# Patient Record
Sex: FEMALE | Race: WHITE | URBAN - METROPOLITAN AREA
[De-identification: names, ages, dates, MRNs, and addresses within clinical notes are randomized per-mention and may not be internally consistent; named-entity substitution may affect disease eponyms.]

---

## 2018-05-17 ENCOUNTER — TRANSFERRED RECORDS (OUTPATIENT)
Dept: HEALTH INFORMATION MANAGEMENT | Facility: CLINIC | Age: 73
End: 2018-05-17

## 2018-05-23 ENCOUNTER — TELEPHONE (OUTPATIENT)
Dept: OBGYN | Facility: CLINIC | Age: 73
End: 2018-05-23

## 2018-05-23 ENCOUNTER — OFFICE VISIT (OUTPATIENT)
Dept: OBGYN | Facility: CLINIC | Age: 73
End: 2018-05-23
Payer: MEDICARE

## 2018-05-23 VITALS
HEIGHT: 64 IN | WEIGHT: 201 LBS | SYSTOLIC BLOOD PRESSURE: 112 MMHG | DIASTOLIC BLOOD PRESSURE: 64 MMHG | BODY MASS INDEX: 34.31 KG/M2

## 2018-05-23 DIAGNOSIS — R93.89 THICKENED ENDOMETRIUM: ICD-10-CM

## 2018-05-23 DIAGNOSIS — N95.0 POSTMENOPAUSAL BLEEDING: Primary | ICD-10-CM

## 2018-05-23 PROCEDURE — 99204 OFFICE O/P NEW MOD 45 MIN: CPT | Performed by: OBSTETRICS & GYNECOLOGY

## 2018-05-23 ASSESSMENT — ANXIETY QUESTIONNAIRES
5. BEING SO RESTLESS THAT IT IS HARD TO SIT STILL: NOT AT ALL
GAD7 TOTAL SCORE: 0
7. FEELING AFRAID AS IF SOMETHING AWFUL MIGHT HAPPEN: NOT AT ALL
6. BECOMING EASILY ANNOYED OR IRRITABLE: NOT AT ALL
2. NOT BEING ABLE TO STOP OR CONTROL WORRYING: NOT AT ALL
3. WORRYING TOO MUCH ABOUT DIFFERENT THINGS: NOT AT ALL
IF YOU CHECKED OFF ANY PROBLEMS ON THIS QUESTIONNAIRE, HOW DIFFICULT HAVE THESE PROBLEMS MADE IT FOR YOU TO DO YOUR WORK, TAKE CARE OF THINGS AT HOME, OR GET ALONG WITH OTHER PEOPLE: NOT DIFFICULT AT ALL
1. FEELING NERVOUS, ANXIOUS, OR ON EDGE: NOT AT ALL

## 2018-05-23 ASSESSMENT — PATIENT HEALTH QUESTIONNAIRE - PHQ9: 5. POOR APPETITE OR OVEREATING: NOT AT ALL

## 2018-05-23 NOTE — PROGRESS NOTES
SUBJECTIVE:                                                   Vee Espino is a 72 year old female who presents to clinic today for the following health issue(s):  Patient presents with:  Consult: referred for post menopausal bleeding        HPI:  Patient is a 72-year-old  4 para 4 who was recently started on hormone therapy by Dr. Melany Woo.  She recently had what she described as a menstrual period.  An ultrasound was obtained which showed a thickened endometrium of 18 mm along with 4-1/2 cm posterior fibroid.  Patient has generally felt fatigued.  She has no other complaints.    No LMP recorded..   Patient is sexually active,    reports that she has quit smoking. She has never used smokeless tobacco.  STD testing offered?  Declined  Health maintenance updated:  Followed by primary care provider      Today's PHQ-9 Score:   PHQ-9 SCORE 2018   Total Score 0     Today's ANISH-7 Score:   ANISH-7 SCORE 2018   Total Score 0       Problem list and histories reviewed & adjusted, as indicated.  Additional history: as documented.    There is no problem list on file for this patient.    Past Surgical History:   Procedure Laterality Date     HERNIA REPAIR, UMBILICAL        Social History   Substance Use Topics     Smoking status: Former Smoker     Smokeless tobacco: Never Used     Alcohol use Not on file           Current Outpatient Prescriptions   Medication Sig     UNABLE TO FIND MEDICATION NAME: Estrogen pellets     No current facility-administered medications for this visit.      Allergies   Allergen Reactions     Cats      Ibuprofen-Diphenhydramine Cit Hives     No Clinical Screening - See Comments      Sulfa Drugs Hives       ROS:  12 point review of systems negative other than symptoms noted below.  Cardiovascular: Lower Extremity Swelling  Gastrointestinal: Abdominal Pain, Bloating and Heartburn  Genitourinary: Cramps  Skin: Rash  Musculoskeletal: Height Loss  Psychiatric: Anxiety and  "Difficulty Sleeping    OBJECTIVE:     /64  Ht 5' 3.5\" (1.613 m)  Wt 201 lb (91.2 kg)  BMI 35.05 kg/m2  Body mass index is 35.05 kg/(m^2).    Exam:  Constitutional:  Appearance: Well nourished, well developed alert, in no acute distress  Neck:  Lymph Nodes:  No lymphadenopathy present; Thyroid:  Gland size normal, nontender, no nodules or masses present on palpation  Chest:  Respiratory Effort:  Breathing unlabored  Cardiovascular: Heart: Auscultation:  Regular rate, normal rhythm, no murmurs present  Lymphatic: Lymph Nodes:  No other lymphadenopathy present  Skin:General Inspection:  No rashes present, no lesions present, no areas of discoloration; .  Neurologic/Psychiatric:  Mental Status:  Oriented X3   No Pelvic Exam performed     In-Clinic Test Results:  No results found for this or any previous visit (from the past 24 hour(s)).    ASSESSMENT/PLAN:                                                        ICD-10-CM    1. Postmenopausal bleeding N95.0 Bernadette-Operative Worksheet GYN   2. Thickened endometrium R93.8 Bernadette-Operative Worksheet GYN           Plan: Patient will be scheduled for hysteroscopy with mild Essure as an outpatient.  The thickened endometrium may represent hyperplasia or possible endometrial polyp.    Yifan Goldberg MD  [facility] [place]  "

## 2018-05-23 NOTE — TELEPHONE ENCOUNTER
Type of surgery: HSC w/MYOSURE  Location of surgery: [facility] OR  Date and time of surgery: 6/14/2018 7:30am ARRIVAL 6am  Surgeon: ANNA Goldberg  Pre-Op Appt Date: 5/23/2018  Post-Op Appt Date: TBD   Packet sent out: HANDED 5/23/2018  Pre-cert/Authorization completed:  TBD  Date: 5/23/2018 Francis w/Lauren Burns  Surgery Scheduler        Order Questions      Question Answer Comment     Procedure name(s) - multi select Hysteroscopy with MyoSure      Reason for procedure Postmenopausal bleeding, thickened endometrium      Is this a multi surgeon case? No      Laterality N/A      Request for additional equipment Other (see comments) None     Anesthesia General      Initiate Pre-op orders for above procedure: Yes, as ordered in Epic Additional orders noted there also     Location of Case: [facility] OR      Surgeon Procedure Time (incision to closure) in minutes (per procedure as applicable) 30      Note:  Surgical Case Time Needed (in minutes)     Patient Class (for admit prior to surgery, specify number of days in comments): Same day (hospital outpatient)      Why can t this outpatient surgery be done at the [facility] or [facility]? na      Vendor Needed? No

## 2018-05-23 NOTE — MR AVS SNAPSHOT
"              After Visit Summary   5/23/2018    Vee Espino    MRN: [id number]           Patient Information     Date Of Birth          1945        Visit Information        Provider Department      5/23/2018 10:30 AM Yifan Goldberg MD [facility]        Today's Diagnoses     Postmenopausal bleeding    -  1    Thickened endometrium           Follow-ups after your visit        Who to contact     If you have questions or need follow up information about today's clinic visit or your schedule please contact [facility] directly at [phone].  Normal or non-critical lab and imaging results will be communicated to you by MyChart, letter or phone within 4 business days after the clinic has received the results. If you do not hear from us within 7 days, please contact the clinic through MyChart or phone. If you have a critical or abnormal lab result, we will notify you by phone as soon as possible.  Submit refill requests through [employer] or call your pharmacy and they will forward the refill request to us. Please allow 3 business days for your refill to be completed.          Additional Information About Your Visit        Care EveryWhere ID     This is your Care EveryWhere ID. This could be used by other organizations to access your [facility] medical records  [id number]        Your Vitals Were     Height BMI (Body Mass Index)                5' 3.5\" (1.613 m) 35.05 kg/m2           Blood Pressure from Last 3 Encounters:   05/23/18 112/64    Weight from Last 3 Encounters:   05/23/18 201 lb (91.2 kg)              We Performed the Following     Bernadette-Operative Worksheet GYN        Primary Care Provider    None Specified       No primary provider on file.        Equal Access to Services     [facility] : Haddinorah Ward, washirley lucharly, qaybta kaalneelam rachel, vanda leonardo. [facility] [phone].    ATENCIÓN: Si habla " español, tiene a yeh disposición servicios gratuitos de asistencia lingüística. Danilo mcnally [phone].    We comply with applicable federal civil rights laws and [place] laws. We do not discriminate on the basis of race, color, national origin, age, disability, sex, sexual orientation, or gender identity.            Thank you!     Thank you for choosing [facility]  for your care. Our goal is always to provide you with excellent care. Hearing back from our patients is one way we can continue to improve our services. Please take a few minutes to complete the written survey that you may receive in the mail after your visit with us. Thank you!             Your Updated Medication List - Protect others around you: Learn how to safely use, store and throw away your medicines at www.disposemymeds.org.          This list is accurate as of 5/23/18 10:54 AM.  Always use your most recent med list.                   Brand Name Dispense Instructions for use Diagnosis    UNABLE TO FIND      MEDICATION NAME: Estrogen pellets

## 2018-05-24 ASSESSMENT — PATIENT HEALTH QUESTIONNAIRE - PHQ9: SUM OF ALL RESPONSES TO PHQ QUESTIONS 1-9: 0

## 2018-05-24 ASSESSMENT — ANXIETY QUESTIONNAIRES: GAD7 TOTAL SCORE: 0

## 2018-06-11 RX ORDER — FLUTICASONE PROPIONATE 50 MCG
SPRAY, SUSPENSION (ML) NASAL DAILY
COMMUNITY

## 2018-06-12 NOTE — H&P (VIEW-ONLY)
SUBJECTIVE:                                                   Vee Espino is a 72 year old female who presents to clinic today for the following health issue(s):  Patient presents with:  Consult: referred for post menopausal bleeding        HPI:  Patient is a 72-year-old  4 para 4 who was recently started on hormone therapy by Dr. Melany Woo.  She recently had what she described as a menstrual period.  An ultrasound was obtained which showed a thickened endometrium of 18 mm along with 4-1/2 cm posterior fibroid.  Patient has generally felt fatigued.  She has no other complaints.    No LMP recorded..   Patient is sexually active,    reports that she has quit smoking. She has never used smokeless tobacco.  STD testing offered?  Declined  Health maintenance updated:  Followed by primary care provider      Today's PHQ-9 Score:   PHQ-9 SCORE 2018   Total Score 0     Today's ANISH-7 Score:   ANISH-7 SCORE 2018   Total Score 0       Problem list and histories reviewed & adjusted, as indicated.  Additional history: as documented.    There is no problem list on file for this patient.    Past Surgical History:   Procedure Laterality Date     HERNIA REPAIR, UMBILICAL        Social History   Substance Use Topics     Smoking status: Former Smoker     Smokeless tobacco: Never Used     Alcohol use Not on file           Current Outpatient Prescriptions   Medication Sig     UNABLE TO FIND MEDICATION NAME: Estrogen pellets     No current facility-administered medications for this visit.      Allergies   Allergen Reactions     Cats      Ibuprofen-Diphenhydramine Cit Hives     No Clinical Screening - See Comments      Sulfa Drugs Hives       ROS:  12 point review of systems negative other than symptoms noted below.  Cardiovascular: Lower Extremity Swelling  Gastrointestinal: Abdominal Pain, Bloating and Heartburn  Genitourinary: Cramps  Skin: Rash  Musculoskeletal: Height Loss  Psychiatric: Anxiety and  "Difficulty Sleeping    OBJECTIVE:     /64  Ht 5' 3.5\" (1.613 m)  Wt 201 lb (91.2 kg)  BMI 35.05 kg/m2  Body mass index is 35.05 kg/(m^2).    Exam:  Constitutional:  Appearance: Well nourished, well developed alert, in no acute distress  Neck:  Lymph Nodes:  No lymphadenopathy present; Thyroid:  Gland size normal, nontender, no nodules or masses present on palpation  Chest:  Respiratory Effort:  Breathing unlabored  Cardiovascular: Heart: Auscultation:  Regular rate, normal rhythm, no murmurs present  Lymphatic: Lymph Nodes:  No other lymphadenopathy present  Skin:General Inspection:  No rashes present, no lesions present, no areas of discoloration; .  Neurologic/Psychiatric:  Mental Status:  Oriented X3   No Pelvic Exam performed     In-Clinic Test Results:  No results found for this or any previous visit (from the past 24 hour(s)).    ASSESSMENT/PLAN:                                                        ICD-10-CM    1. Postmenopausal bleeding N95.0 Bernadette-Operative Worksheet GYN   2. Thickened endometrium R93.8 Bernadette-Operative Worksheet GYN           Plan: Patient will be scheduled for hysteroscopy with mild Essure as an outpatient.  The thickened endometrium may represent hyperplasia or possible endometrial polyp.    Yifan Goldberg MD  [facility] [place]  "

## 2018-06-13 ENCOUNTER — ANESTHESIA EVENT (OUTPATIENT)
Dept: SURGERY | Facility: CLINIC | Age: 73
End: 2018-06-13
Payer: MEDICARE

## 2018-06-13 ASSESSMENT — LIFESTYLE VARIABLES: TOBACCO_USE: 1

## 2018-06-13 NOTE — ANESTHESIA PREPROCEDURE EVALUATION
Anesthesia Evaluation     . Pt has had prior anesthetic. Type: General    No history of anesthetic complications          ROS/MED HX    ENT/Pulmonary:     (+)sleep apnea, tobacco use, Past use doesn't use CPAP , . .    Neurologic:       Cardiovascular:         METS/Exercise Tolerance:     Hematologic:         Musculoskeletal:         GI/Hepatic:         Renal/Genitourinary:         Endo:     (+) Obesity, .      Psychiatric:         Infectious Disease:         Malignancy:         Other: Comment: Takes Lasix for ankle swelling; none this past week;                    Physical Exam  Normal systems: cardiovascular and pulmonary    Airway   Mallampati: II  TM distance: >3 FB  Neck ROM: full    Dental     Cardiovascular       Pulmonary                     Anesthesia Plan      History & Physical Review  History and physical reviewed and following examination; no interval change.    ASA Status:  2 .    NPO Status:  > 8 hours    Plan for General and LMA with Intravenous and Propofol induction. Maintenance will be TIVA.    PONV prophylaxis:  Ondansetron (or other 5HT-3) and Dexamethasone or Solumedrol       Postoperative Care  Postoperative pain management:  IV analgesics and Oral pain medications.      Consents  Anesthetic plan, risks, benefits and alternatives discussed with:  Patient..        DPreop diagnosis: post menopausal bleeding, thickened endometrium   Procedure(s):  OPERATIVE HYSTEROSCOPY WITH MORCELLATOR (MYOSURE)  Allergies   Allergen Reactions     Cats      Ibuprofen Hives     No Clinical Screening - See Comments      Sulfa Drugs Hives       No current facility-administered medications on file prior to encounter.   Current Outpatient Prescriptions on File Prior to Encounter:  UNABLE TO FIND MEDICATION NAME: Estrogen pellets     No results found for: HGB, INR, POTASSIUM                    .

## 2018-06-14 ENCOUNTER — ANESTHESIA (OUTPATIENT)
Dept: SURGERY | Facility: CLINIC | Age: 73
End: 2018-06-14
Payer: MEDICARE

## 2018-06-14 ENCOUNTER — HOSPITAL ENCOUNTER (OUTPATIENT)
Facility: CLINIC | Age: 73
Discharge: HOME | End: 2018-06-14
Attending: OBSTETRICS & GYNECOLOGY | Admitting: OBSTETRICS & GYNECOLOGY
Payer: MEDICARE

## 2018-06-14 ENCOUNTER — SURGERY (OUTPATIENT)
Age: 73
End: 2018-06-14
Payer: MEDICARE

## 2018-06-14 VITALS
TEMPERATURE: 97.6 F | HEIGHT: 63 IN | RESPIRATION RATE: 16 BRPM | OXYGEN SATURATION: 98 % | WEIGHT: 198.6 LBS | DIASTOLIC BLOOD PRESSURE: 80 MMHG | BODY MASS INDEX: 35.19 KG/M2 | SYSTOLIC BLOOD PRESSURE: 142 MMHG

## 2018-06-14 DIAGNOSIS — G89.18 ACUTE POST-OPERATIVE PAIN: Primary | ICD-10-CM

## 2018-06-14 PROCEDURE — 88305 TISSUE EXAM BY PATHOLOGIST: CPT | Mod: 26 | Performed by: OBSTETRICS & GYNECOLOGY

## 2018-06-14 PROCEDURE — 36000058 ZZH SURGERY LEVEL 3 EA 15 ADDTL MIN: Performed by: OBSTETRICS & GYNECOLOGY

## 2018-06-14 PROCEDURE — 27210794 ZZH OR GENERAL SUPPLY STERILE: Performed by: OBSTETRICS & GYNECOLOGY

## 2018-06-14 PROCEDURE — 58558 HYSTEROSCOPY BIOPSY: CPT | Performed by: OBSTETRICS & GYNECOLOGY

## 2018-06-14 PROCEDURE — 25000132 ZZH RX MED GY IP 250 OP 250 PS 637: Mod: GY | Performed by: OBSTETRICS & GYNECOLOGY

## 2018-06-14 PROCEDURE — 40000170 ZZH STATISTIC PRE-PROCEDURE ASSESSMENT II: Performed by: OBSTETRICS & GYNECOLOGY

## 2018-06-14 PROCEDURE — 88305 TISSUE EXAM BY PATHOLOGIST: CPT | Performed by: OBSTETRICS & GYNECOLOGY

## 2018-06-14 PROCEDURE — 71000027 ZZH RECOVERY PHASE 2 EACH 15 MINS: Performed by: OBSTETRICS & GYNECOLOGY

## 2018-06-14 PROCEDURE — 71000012 ZZH RECOVERY PHASE 1 LEVEL 1 FIRST HR: Performed by: OBSTETRICS & GYNECOLOGY

## 2018-06-14 PROCEDURE — 37000008 ZZH ANESTHESIA TECHNICAL FEE, 1ST 30 MIN: Performed by: OBSTETRICS & GYNECOLOGY

## 2018-06-14 PROCEDURE — 25000128 H RX IP 250 OP 636: Performed by: OBSTETRICS & GYNECOLOGY

## 2018-06-14 PROCEDURE — 25000128 H RX IP 250 OP 636: Performed by: NURSE ANESTHETIST, CERTIFIED REGISTERED

## 2018-06-14 PROCEDURE — 37000009 ZZH ANESTHESIA TECHNICAL FEE, EACH ADDTL 15 MIN: Performed by: OBSTETRICS & GYNECOLOGY

## 2018-06-14 PROCEDURE — 36000056 ZZH SURGERY LEVEL 3 1ST 30 MIN: Performed by: OBSTETRICS & GYNECOLOGY

## 2018-06-14 PROCEDURE — A9270 NON-COVERED ITEM OR SERVICE: HCPCS | Mod: GY | Performed by: OBSTETRICS & GYNECOLOGY

## 2018-06-14 PROCEDURE — 25000125 ZZHC RX 250: Performed by: NURSE ANESTHETIST, CERTIFIED REGISTERED

## 2018-06-14 PROCEDURE — 71000013 ZZH RECOVERY PHASE 1 LEVEL 1 EA ADDTL HR: Performed by: OBSTETRICS & GYNECOLOGY

## 2018-06-14 RX ORDER — ONDANSETRON 4 MG/1
4 TABLET, ORALLY DISINTEGRATING ORAL EVERY 30 MIN PRN
Status: DISCONTINUED | OUTPATIENT
Start: 2018-06-14 | End: 2018-06-14 | Stop reason: HOSPADM

## 2018-06-14 RX ORDER — FENTANYL CITRATE 50 UG/ML
25-50 INJECTION, SOLUTION INTRAMUSCULAR; INTRAVENOUS
Status: DISCONTINUED | OUTPATIENT
Start: 2018-06-14 | End: 2018-06-14 | Stop reason: HOSPADM

## 2018-06-14 RX ORDER — PROPOFOL 10 MG/ML
INJECTION, EMULSION INTRAVENOUS PRN
Status: DISCONTINUED | OUTPATIENT
Start: 2018-06-14 | End: 2018-06-14

## 2018-06-14 RX ORDER — LIDOCAINE HYDROCHLORIDE 20 MG/ML
INJECTION, SOLUTION INFILTRATION; PERINEURAL PRN
Status: DISCONTINUED | OUTPATIENT
Start: 2018-06-14 | End: 2018-06-14

## 2018-06-14 RX ORDER — NALOXONE HYDROCHLORIDE 0.4 MG/ML
.1-.4 INJECTION, SOLUTION INTRAMUSCULAR; INTRAVENOUS; SUBCUTANEOUS
Status: DISCONTINUED | OUTPATIENT
Start: 2018-06-14 | End: 2018-06-14 | Stop reason: HOSPADM

## 2018-06-14 RX ORDER — PROPOFOL 10 MG/ML
INJECTION, EMULSION INTRAVENOUS CONTINUOUS PRN
Status: DISCONTINUED | OUTPATIENT
Start: 2018-06-14 | End: 2018-06-14

## 2018-06-14 RX ORDER — CEFAZOLIN SODIUM 1 G/3ML
1 INJECTION, POWDER, FOR SOLUTION INTRAMUSCULAR; INTRAVENOUS SEE ADMIN INSTRUCTIONS
Status: DISCONTINUED | OUTPATIENT
Start: 2018-06-14 | End: 2018-06-14 | Stop reason: HOSPADM

## 2018-06-14 RX ORDER — PHYSOSTIGMINE SALICYLATE 1 MG/ML
1.2 INJECTION INTRAVENOUS
Status: DISCONTINUED | OUTPATIENT
Start: 2018-06-14 | End: 2018-06-14 | Stop reason: HOSPADM

## 2018-06-14 RX ORDER — FENTANYL CITRATE 50 UG/ML
INJECTION, SOLUTION INTRAMUSCULAR; INTRAVENOUS PRN
Status: DISCONTINUED | OUTPATIENT
Start: 2018-06-14 | End: 2018-06-14

## 2018-06-14 RX ORDER — OXYCODONE HYDROCHLORIDE 5 MG/1
5 TABLET ORAL
Status: COMPLETED | OUTPATIENT
Start: 2018-06-14 | End: 2018-06-14

## 2018-06-14 RX ORDER — FENTANYL CITRATE 50 UG/ML
25-50 INJECTION, SOLUTION INTRAMUSCULAR; INTRAVENOUS EVERY 5 MIN PRN
Status: DISCONTINUED | OUTPATIENT
Start: 2018-06-14 | End: 2018-06-14 | Stop reason: HOSPADM

## 2018-06-14 RX ORDER — MEPERIDINE HYDROCHLORIDE 25 MG/ML
12.5 INJECTION INTRAMUSCULAR; INTRAVENOUS; SUBCUTANEOUS
Status: DISCONTINUED | OUTPATIENT
Start: 2018-06-14 | End: 2018-06-14 | Stop reason: HOSPADM

## 2018-06-14 RX ORDER — HYDROMORPHONE HYDROCHLORIDE 1 MG/ML
.3-.5 INJECTION, SOLUTION INTRAMUSCULAR; INTRAVENOUS; SUBCUTANEOUS EVERY 10 MIN PRN
Status: DISCONTINUED | OUTPATIENT
Start: 2018-06-14 | End: 2018-06-14 | Stop reason: HOSPADM

## 2018-06-14 RX ORDER — DEXAMETHASONE SODIUM PHOSPHATE 4 MG/ML
INJECTION, SOLUTION INTRA-ARTICULAR; INTRALESIONAL; INTRAMUSCULAR; INTRAVENOUS; SOFT TISSUE PRN
Status: DISCONTINUED | OUTPATIENT
Start: 2018-06-14 | End: 2018-06-14

## 2018-06-14 RX ORDER — CEFAZOLIN SODIUM 2 G/100ML
2 INJECTION, SOLUTION INTRAVENOUS
Status: COMPLETED | OUTPATIENT
Start: 2018-06-14 | End: 2018-06-14

## 2018-06-14 RX ORDER — ACETAMINOPHEN 325 MG/1
650 TABLET ORAL EVERY 4 HOURS PRN
Qty: 100 TABLET | Refills: 0 | Status: SHIPPED | OUTPATIENT
Start: 2018-06-14

## 2018-06-14 RX ORDER — ACETAMINOPHEN 325 MG/1
650 TABLET ORAL
Status: DISCONTINUED | OUTPATIENT
Start: 2018-06-14 | End: 2018-06-14 | Stop reason: HOSPADM

## 2018-06-14 RX ORDER — SODIUM CHLORIDE, SODIUM LACTATE, POTASSIUM CHLORIDE, CALCIUM CHLORIDE 600; 310; 30; 20 MG/100ML; MG/100ML; MG/100ML; MG/100ML
INJECTION, SOLUTION INTRAVENOUS CONTINUOUS
Status: DISCONTINUED | OUTPATIENT
Start: 2018-06-14 | End: 2018-06-14 | Stop reason: HOSPADM

## 2018-06-14 RX ORDER — ONDANSETRON 2 MG/ML
INJECTION INTRAMUSCULAR; INTRAVENOUS PRN
Status: DISCONTINUED | OUTPATIENT
Start: 2018-06-14 | End: 2018-06-14

## 2018-06-14 RX ORDER — SODIUM CHLORIDE, SODIUM LACTATE, POTASSIUM CHLORIDE, CALCIUM CHLORIDE 600; 310; 30; 20 MG/100ML; MG/100ML; MG/100ML; MG/100ML
INJECTION, SOLUTION INTRAVENOUS CONTINUOUS PRN
Status: DISCONTINUED | OUTPATIENT
Start: 2018-06-14 | End: 2018-06-14

## 2018-06-14 RX ORDER — ACETAMINOPHEN 325 MG/1
325 TABLET ORAL ONCE
Status: COMPLETED | OUTPATIENT
Start: 2018-06-14 | End: 2018-06-14

## 2018-06-14 RX ORDER — ONDANSETRON 2 MG/ML
4 INJECTION INTRAMUSCULAR; INTRAVENOUS EVERY 30 MIN PRN
Status: DISCONTINUED | OUTPATIENT
Start: 2018-06-14 | End: 2018-06-14 | Stop reason: HOSPADM

## 2018-06-14 RX ADMIN — ACETAMINOPHEN 325 MG: 325 TABLET ORAL at 09:50

## 2018-06-14 RX ADMIN — CEFAZOLIN SODIUM 2 G: 2 INJECTION, SOLUTION INTRAVENOUS at 07:36

## 2018-06-14 RX ADMIN — SODIUM CHLORIDE, POTASSIUM CHLORIDE, SODIUM LACTATE AND CALCIUM CHLORIDE: 600; 310; 30; 20 INJECTION, SOLUTION INTRAVENOUS at 07:29

## 2018-06-14 RX ADMIN — PROPOFOL 180 MCG/KG/MIN: 10 INJECTION, EMULSION INTRAVENOUS at 07:35

## 2018-06-14 RX ADMIN — ONDANSETRON 4 MG: 2 INJECTION INTRAMUSCULAR; INTRAVENOUS at 07:51

## 2018-06-14 RX ADMIN — FENTANYL CITRATE 50 MCG: 50 INJECTION, SOLUTION INTRAMUSCULAR; INTRAVENOUS at 07:49

## 2018-06-14 RX ADMIN — OXYCODONE HYDROCHLORIDE 5 MG: 5 TABLET ORAL at 09:50

## 2018-06-14 RX ADMIN — PROPOFOL 200 MG: 10 INJECTION, EMULSION INTRAVENOUS at 07:35

## 2018-06-14 RX ADMIN — DEXAMETHASONE SODIUM PHOSPHATE 4 MG: 4 INJECTION, SOLUTION INTRA-ARTICULAR; INTRALESIONAL; INTRAMUSCULAR; INTRAVENOUS; SOFT TISSUE at 07:38

## 2018-06-14 RX ADMIN — LIDOCAINE HYDROCHLORIDE 60 MG: 20 INJECTION, SOLUTION INFILTRATION; PERINEURAL at 07:35

## 2018-06-14 RX ADMIN — MIDAZOLAM 2 MG: 1 INJECTION INTRAMUSCULAR; INTRAVENOUS at 07:35

## 2018-06-14 RX ADMIN — FENTANYL CITRATE 50 MCG: 50 INJECTION, SOLUTION INTRAMUSCULAR; INTRAVENOUS at 07:35

## 2018-06-14 RX ADMIN — ACETAMINOPHEN 650 MG: 325 TABLET, FILM COATED ORAL at 08:42

## 2018-06-14 NOTE — DISCHARGE INSTRUCTIONS
[facility]  Discharge Instructions  Following D&C and Hysteroscopy    Activity  You may resume normal activities including lifting as needed.  It is permissible to climb stairs. You may drive a car after 24 hours as long as you are not taking narcotic pain pills.   Baths or showers are perfectly acceptable.     Vaginal Discharge  You may have some vaginal bleeding or discharge for about a week after procedure.  You may use tampons or pads.    Temperature  If you develop temperature elevations to over 101  Fahrenheit, your physician should be called immediately.    Diet  [place] or light diet is advisable the day of surgery.  If nausea persists, continue this diet.  If severe, call.    Follow-up  Make an appointment in 1-2 weeks if instructed to at: [phone]            Same Day Surgery Discharge Instructions for  Sedation and General Anesthesia       It's not unusual to feel dizzy, light-headed or faint for up to 24 hours after surgery or while taking pain medication.  If you have these symptoms: sit for a few minutes before standing and have someone assist you when you get up to walk or use the bathroom.      You should rest and relax for the next 24 hours. We recommend you make arrangements to have an adult stay with you for at least 24 hours after your discharge.  Avoid hazardous and strenuous activity.      DO NOT DRIVE any vehicle or operate mechanical equipment for 24 hours following the end of your surgery.  Even though you may feel normal, your reactions may be affected by the medication you have received.      Do not drink alcoholic beverages for 24 hours following surgery.       Slowly progress to your regular diet as you feel able. It's not unusual to feel nauseated and/or vomit after receiving anesthesia.  If you develop these symptoms, drink clear liquids (apple juice, ginger ale, broth, 7-up, etc. ) until you feel better.  If your nausea and vomiting persists for 24 hours, please  notify your surgeon.        All narcotic pain medications, along with inactivity and anesthesia, can cause constipation. Drinking plenty of liquids and increasing fiber intake will help.      For any questions of a medical nature, call your surgeon.      Do not make important decisions for 24 hours.      If you had general anesthesia, you may have a sore throat for a couple of days related to the breathing tube used during surgery.  You may use Cepacol lozenges to help with this discomfort.  If it worsens or if you develop a fever, contact your surgeon.       If you feel your pain is not well managed with the pain medications prescribed by your surgeon, please contact your surgeon's office to let them know so they can address your concerns.              Today you were given 650 mg of Tylenol at 8:42 am. The recommended daily maximum dose is 4000 mg.         **If you have questions or concerns about your procedure,  call Dr. Goldberg at [phone]**

## 2018-06-14 NOTE — IP AVS SNAPSHOT
MRN:[id number]                      After Visit Summary   6/14/2018    Vee Espino    MRN: [id number]           Thank you!     Thank you for choosing [facility] for your care. Our goal is always to provide you with excellent care. Hearing back from our patients is one way we can continue to improve our services. Please take a few minutes to complete the written survey that you may receive in the mail after you visit with us. Thank you!        Patient Information     Date Of Birth          1945        About your hospital stay     You were admitted on:  June 14, 2018 You last received care in the:  [facility] Same Day Surgery    You were discharged on:  June 14, 2018       Who to Call     For medical emergencies, please call 911.  For non-urgent questions about your medical care, please call your primary care provider or clinic, [phone]  For questions related to your surgery, please call your surgery clinic        Attending Provider     Provider Yifan Montero MD OB/Gyn       Primary Care Provider Office Phone # Fax #    [facility] [place] [facility] [phone]      After Care Instructions     Discharge Instructions       Patient to arrange follow up appointment in 2  weeks                  Further instructions from your care team       [facility]  Discharge Instructions  Following D&C and Hysteroscopy    Activity  You may resume normal activities including lifting as needed.  It is permissible to climb stairs. You may drive a car after 24 hours as long as you are not taking narcotic pain pills.   Baths or showers are perfectly acceptable.     Vaginal Discharge  You may have some vaginal bleeding or discharge for about a week after procedure.  You may use tampons or pads.    Temperature  If you develop temperature elevations to over 101  Fahrenheit, your physician should be called immediately.    Diet  [place] or light  diet is advisable the day of surgery.  If nausea persists, continue this diet.  If severe, call.    Follow-up  Make an appointment in 1-2 weeks if instructed to at: [phone]            Same Day Surgery Discharge Instructions for  Sedation and General Anesthesia       It's not unusual to feel dizzy, light-headed or faint for up to 24 hours after surgery or while taking pain medication.  If you have these symptoms: sit for a few minutes before standing and have someone assist you when you get up to walk or use the bathroom.      You should rest and relax for the next 24 hours. We recommend you make arrangements to have an adult stay with you for at least 24 hours after your discharge.  Avoid hazardous and strenuous activity.      DO NOT DRIVE any vehicle or operate mechanical equipment for 24 hours following the end of your surgery.  Even though you may feel normal, your reactions may be affected by the medication you have received.      Do not drink alcoholic beverages for 24 hours following surgery.       Slowly progress to your regular diet as you feel able. It's not unusual to feel nauseated and/or vomit after receiving anesthesia.  If you develop these symptoms, drink clear liquids (apple juice, ginger ale, broth, 7-up, etc. ) until you feel better.  If your nausea and vomiting persists for 24 hours, please notify your surgeon.        All narcotic pain medications, along with inactivity and anesthesia, can cause constipation. Drinking plenty of liquids and increasing fiber intake will help.      For any questions of a medical nature, call your surgeon.      Do not make important decisions for 24 hours.      If you had general anesthesia, you may have a sore throat for a couple of days related to the breathing tube used during surgery.  You may use Cepacol lozenges to help with this discomfort.  If it worsens or if you develop a fever, contact your surgeon.       If you feel your pain is not well managed  "with the pain medications prescribed by your surgeon, please contact your surgeon's office to let them know so they can address your concerns.              Today you were given 650 mg of Tylenol at 8:42 am. The recommended daily maximum dose is 4000 mg.         **If you have questions or concerns about your procedure,  call Dr. oGldberg at [phone]**    Pending Results     Date and Time Order Name Status Description    6/14/2018 0748 Surgical pathology exam In process             Admission Information     Date & Time Provider Department Dept. Phone    6/14/2018 Yifan Goldberg MD [facility] Same Day Surgery [phone]      Your Vitals Were     Blood Pressure Temperature Respirations Height Weight Pulse Oximetry    147/64 97.6  F (36.4  C) (Temporal) 12 1.6 m (5' 3\") 90.1 kg (198 lb 9.6 oz) 98%    BMI (Body Mass Index)                   35.18 kg/m2           Equal Access to Services     [facility] : Hadii erik escalona hadprachio Sonathan, waaxda luqadaha, qaybta kaalmada adeegyada, vanda mejia . So [facility] [phone].    ATENCIÓN: Si habla español, tiene a yeh disposición servicios gratuitos de asistencia lingüística. Llroberto al [phone].    We comply with applicable federal civil rights laws and [place] laws. We do not discriminate on the basis of race, color, national origin, age, disability, sex, sexual orientation, or gender identity.               Review of your medicines      START taking        Dose / Directions    acetaminophen 325 MG tablet   Commonly known as:  TYLENOL   Used for:  Acute post-operative pain        Dose:  650 mg   Take 2 tablets (650 mg) by mouth every 4 hours as needed for other (mild pain)   Quantity:  100 tablet   Refills:  0         CONTINUE these medicines which have NOT CHANGED        Dose / Directions    fluticasone 50 MCG/ACT spray   Commonly known as:  FLONASE        Spray into both nostrils daily   Refills:  0       LASIX PO        Dose: "  20 mg   Take 20 mg by mouth every morning   Refills:  0       loratadine-pseudoePHEDrine  MG per 24 hr tablet   Commonly known as:  CLARITIN-D 24-hour        Dose:  1 tablet   Take 1 tablet by mouth every morning   Refills:  0       UNABLE TO FIND        MEDICATION NAME: Estrogen pellets   Refills:  0       VITAMIN D (CHOLECALCIFEROL) PO        Dose:  1 tablet   Take 1 tablet by mouth daily   Refills:  0            Where to get your medicines      These medications were sent to [facility] - [address]  [address], [place] [address]     Phone:  [phone]     acetaminophen 325 MG tablet                Protect others around you: Learn how to safely use, store and throw away your medicines at www.disposemymeds.org.             Medication List: This is a list of all your medications and when to take them. Check marks below indicate your daily home schedule. Keep this list as a reference.      Medications           Morning Afternoon Evening Bedtime As Needed    acetaminophen 325 MG tablet   Commonly known as:  TYLENOL   Take 2 tablets (650 mg) by mouth every 4 hours as needed for other (mild pain)   Last time this was given:  650 mg on 6/14/2018  8:42 AM   Last time this was given:  Took two tablets at 8:42 am                                fluticasone 50 MCG/ACT spray   Commonly known as:  FLONASE   Spray into both nostrils daily                                LASIX PO   Take 20 mg by mouth every morning                                loratadine-pseudoePHEDrine  MG per 24 hr tablet   Commonly known as:  CLARITIN-D 24-hour   Take 1 tablet by mouth every morning                                UNABLE TO FIND   MEDICATION NAME: Estrogen pellets                                VITAMIN D (CHOLECALCIFEROL) PO   Take 1 tablet by mouth daily

## 2018-06-14 NOTE — OP NOTE
Procedure Date: 2018      PREOPERATIVE STATUS:  The patient is a 72-year-old white female with postmenopausal bleeding who on ultrasound examination had an endometrium measured at 18 mm.  She is now admitted for hysteroscopy and possible MyoSure resection.      PREOPERATIVE DIAGNOSIS:  Postmenopausal bleeding and thickened endometrium.      PROCEDURES:  Hysteroscopy with MyoSure resection of endometrium.      SURGEON:  Michaela Goldberg MD      ANESTHESIA:  General.      OPERATIVE PROCEDURE:  Under general anesthesia, the patient was placed in lithotomy position, prepped and draped in the usual fashion.  Speculum was placed in the vagina.  Anterior lip of the cervix was grasped with a tenaculum.  The uterus was probed and sounded to 10 cm.  The cervix was dilated through 21-[place] and using the MyoSure hysteroscope, the cavity was explored with findings of a normal-appearing cavity with no intracavitary polyps or myomas.  The lining was thickened.  Using the resection tool, the thickened lining was resected from throughout the cavity.  At the end of the case, the instruments were removed.  Estimated blood loss was 1 mL.  The patient tolerated the procedure well and was taken to recovery in good condition.      POSTOPERATIVE DIAGNOSES:     1.  Thickened endometrium.    2.  Final pathology pending.         MICHAELA GOLDBERG MD             D: 2018   T: 2018   MT: SURYA      Name:     ABHISHEK LONGORIA   MRN:      [id number]        Account:        [id number]   :      1945           Procedure Date: 2018      Document: [id number]       cc: Melany Leonardo MD

## 2018-06-14 NOTE — BRIEF OP NOTE
[facility] Brief Operative Note    Pre-operative diagnosis: post menopausal bleeding, thickened endometrium    Post-operative diagnosis Same   Procedure: Procedure(s):  HYSTEROSCOPY WITH MYOSURE  - Wound Class: II-Clean Contaminated   Surgeon(s): Surgeon(s) and Role:     * Yifan Goldberg MD - Primary   Estimated blood loss: * No values recorded between 6/14/2018  7:43 AM and 6/14/2018  7:52 AM *    Specimens:   ID Type Source Tests Collected by Time Destination   A : Endometrial Curettings Tissue Uterus SURGICAL PATHOLOGY EXAM Yifan Goldberg MD 6/14/2018  7:48 AM       Findings:  Uterus sounded to 10 cm.  The endometrial cavity was smooth and regular with a thickened endometrium.  There was no polyps or intracavitary myomas.  A thorough sampling of the lining was performed by the MyoSuscooter

## 2018-06-14 NOTE — ANESTHESIA POSTPROCEDURE EVALUATION
Patient: Vee Espino    Procedure(s):  HYSTEROSCOPY WITH MYOSURE  - Wound Class: II-Clean Contaminated    Diagnosis:post menopausal bleeding, thickened endometrium   Diagnosis Additional Information: No value filed.    Anesthesia Type:  General, LMA    Note:  Anesthesia Post Evaluation    Patient location during evaluation: PACU  Patient participation: Able to fully participate in evaluation  Level of consciousness: awake  Pain management: adequate  Airway patency: patent  Cardiovascular status: acceptable  Respiratory status: acceptable  Hydration status: acceptable  PONV: controlled     Anesthetic complications: None          Last vitals:  Vitals:    06/14/18 0652 06/14/18 0804 06/14/18 0815   BP: 149/76 137/78 157/71   Resp: 16 8 9   Temp: 36.6  C (97.8  F) 36.4  C (97.6  F)    SpO2: 99% 98% 100%         Electronically Signed By: Anibal Miller MD  June 14, 2018  8:23 AM

## 2018-06-14 NOTE — ANESTHESIA CARE TRANSFER NOTE
Patient: Vee Espino    Procedure(s):  HYSTEROSCOPY WITH MYOSURE  - Wound Class: II-Clean Contaminated    Diagnosis: post menopausal bleeding, thickened endometrium   Diagnosis Additional Information: No value filed.    Anesthesia Type:   General, LMA     Note:  Airway :Face Mask  Patient transferred to:PACU  Handoff Report: Identifed the Patient, Identified the Reponsible Provider, Reviewed the pertinent medical history, Discussed the surgical course, Reviewed Intra-OP anesthesia mangement and issues during anesthesia, Set expectations for post-procedure period and Allowed opportunity for questions and acknowledgement of understanding  Arousable, good air exchange, report to RN    Vitals: (Last set prior to Anesthesia Care Transfer)    CRNA VITALS  6/14/2018 0737 - 6/14/2018 0807      6/14/2018             Resp Rate (set): 10        BP: 137/78  P: 64  SaO2 :99%        Electronically Signed By: COSME Baptiste CRNA  June 14, 2018  8:07 AM

## 2018-06-14 NOTE — IP AVS SNAPSHOT
[facility]    [address]    [place] [address]    Phone:  [phone]    Fax:  [phone]                                       After Visit Summary   6/14/2018    Vee Espino    MRN: [id number]           After Visit Summary Signature Page     I have received my discharge instructions, and my questions have been answered. I have discussed any challenges I see with this plan with the nurse or doctor.    ..........................................................................................................................................  Patient/Patient Representative Signature      ..........................................................................................................................................  Patient Representative Print Name and Relationship to Patient    ..................................................               ................................................  Date                                            Time    ..........................................................................................................................................  Reviewed by Signature/Title    ...................................................              ..............................................  Date                                                            Time

## 2018-06-15 LAB — COPATH REPORT: NORMAL

## 2018-06-19 ENCOUNTER — TELEPHONE (OUTPATIENT)
Dept: OBGYN | Facility: CLINIC | Age: 73
End: 2018-06-19

## 2018-06-19 DIAGNOSIS — M79.89 SWELLING OF LOWER EXTREMITY: Primary | ICD-10-CM

## 2018-06-19 RX ORDER — FUROSEMIDE 20 MG
20 TABLET ORAL EVERY MORNING
Qty: 30 TABLET | Refills: 1 | Status: SHIPPED | OUTPATIENT
Start: 2018-06-19 | End: 2018-09-12

## 2018-06-19 NOTE — TELEPHONE ENCOUNTER
"Pt calling for surgical pathology report s/p Hysteroscopy with MyoSure resection of endometrium 6/14/18 . Informed her results: No atypical or malignant changes are seen.  Pt states she is still \"having a period\", reassured her bleeding may continue up to 7 days.  Pt verbalized understanding and no further questions.    Second request from pt, she stated Dr. Goldberg agreed to take pt on as her primary care MD as her previous doctor retired. She has been on Lasix 20 mg PO Daily for left foot swelling, and Lasix has been helpful. She is wondering if [facility] would be willing to refill her Rx for her. It is a historical prescription:  Furosemide (LASIX PO)     --   Sig - Route: Take 20 mg by mouth every morning - Oral   Class: Historical   Order: [id number]     Routing to Dr. Goldberg to advise on Refill request of Lasix.  "

## 2018-06-19 NOTE — TELEPHONE ENCOUNTER
Refill for Lasix 20 mg Po Daily #30 with 1 refill granted and sent to her pharmacy of choice.  Called pt to inform of refill.   Pt verbalized understanding and no further questions.

## 2018-06-28 ENCOUNTER — OFFICE VISIT (OUTPATIENT)
Dept: OBGYN | Facility: CLINIC | Age: 73
End: 2018-06-28
Payer: MEDICARE

## 2018-06-28 VITALS
BODY MASS INDEX: 35.08 KG/M2 | HEART RATE: 74 BPM | DIASTOLIC BLOOD PRESSURE: 68 MMHG | SYSTOLIC BLOOD PRESSURE: 110 MMHG | HEIGHT: 63 IN | WEIGHT: 198 LBS

## 2018-06-28 DIAGNOSIS — Z48.816 AFTERCARE FOLLOWING SURGERY OF THE GENITOURINARY SYSTEM: Primary | ICD-10-CM

## 2018-06-28 DIAGNOSIS — R53.82 CHRONIC FATIGUE: ICD-10-CM

## 2018-06-28 DIAGNOSIS — N85.01 ENDOMETRIAL HYPERPLASIA WITHOUT ATYPIA, SIMPLE: Primary | ICD-10-CM

## 2018-06-28 LAB — HGB BLD-MCNC: 13.2 G/DL (ref 11.7–15.7)

## 2018-06-28 PROCEDURE — 36415 COLL VENOUS BLD VENIPUNCTURE: CPT | Performed by: OBSTETRICS & GYNECOLOGY

## 2018-06-28 PROCEDURE — 85018 HEMOGLOBIN: CPT | Performed by: OBSTETRICS & GYNECOLOGY

## 2018-06-28 PROCEDURE — 99213 OFFICE O/P EST LOW 20 MIN: CPT | Performed by: OBSTETRICS & GYNECOLOGY

## 2018-06-28 NOTE — MR AVS SNAPSHOT
"              After Visit Summary   6/28/2018    Vee Espino    MRN: [id number]           Patient Information     Date Of Birth          1945        Visit Information        Provider Department      6/28/2018 11:15 AM Yifan Goldberg MD [facility] [place]         Follow-ups after your visit        Your next 10 appointments already scheduled     Oct 10, 2018 10:30 AM CDT   SHORT with Yifan Goldberg MD   [facility] [place] ([facility])    [address] [address] [phone]              Who to contact     If you have questions or need follow up information about today's clinic visit or your schedule please contact [facility] directly at [phone].  Normal or non-critical lab and imaging results will be communicated to you by MyChart, letter or phone within 4 business days after the clinic has received the results. If you do not hear from us within 7 days, please contact the clinic through MyChart or phone. If you have a critical or abnormal lab result, we will notify you by phone as soon as possible.  Submit refill requests through [employer] or call your pharmacy and they will forward the refill request to us. Please allow 3 business days for your refill to be completed.          Additional Information About Your Visit        Your Vitals Were     Pulse Height BMI (Body Mass Index)             74 5' 3\" (1.6 m) 35.07 kg/m2          Blood Pressure from Last 3 Encounters:   06/28/18 110/68   06/14/18 142/80   05/23/18 112/64    Weight from Last 3 Encounters:   06/28/18 198 lb (89.8 kg)   06/14/18 198 lb 9.6 oz (90.1 kg)   05/23/18 201 lb (91.2 kg)              Today, you had the following     No orders found for display       Primary Care Provider Office Phone # Fax #    [facility] [place] [facility] [phone]       [facility] [address] [address]        Equal Access to Services     ALTAF COOK : Hadii erik Ward, corrie cortez, vanda barrios. So [facility] [phone].    ATENCIÓN: Si habla español, tiene a yeh disposición servicios gratuitos de asistencia lingüística. Llame al [phone].    We comply with applicable federal civil rights laws and [place] laws. We do not discriminate on the basis of race, color, national origin, age, disability, sex, sexual orientation, or gender identity.            Thank you!     Thank you for choosing [facility] [place]  for your care. Our goal is always to provide you with excellent care. Hearing back from our patients is one way we can continue to improve our services. Please take a few minutes to complete the written survey that you may receive in the mail after your visit with us. Thank you!             Your Updated Medication List - Protect others around you: Learn how to safely use, store and throw away your medicines at www.disposemymeds.org.          This list is accurate as of 6/28/18 11:24 AM.  Always use your most recent med list.                   Brand Name Dispense Instructions for use Diagnosis    acetaminophen 325 MG tablet    TYLENOL    100 tablet    Take 2 tablets (650 mg) by mouth every 4 hours as needed for other (mild pain)    Acute post-operative pain       fluticasone 50 MCG/ACT spray    FLONASE     Spray into both nostrils daily        furosemide 20 MG tablet    LASIX    30 tablet    Take 1 tablet (20 mg) by mouth every morning    Swelling of lower extremity       loratadine-pseudoePHEDrine  MG per 24 hr tablet    CLARITIN-D 24-hour     Take 1 tablet by mouth every morning        UNABLE TO FIND      MEDICATION NAME: Estrogen pellets        VITAMIN D (CHOLECALCIFEROL) PO      Take 1 tablet by mouth daily

## 2018-06-28 NOTE — PROGRESS NOTES
Patient returns for postop follow-up.  She had a hysteroscopy with MyoSure resection of endometrium for thickened endometrium.  Pathology showed simple endometrial hyperplasia without atypia.  She has continued to have some light spotting and staining.  She otherwise feels generally tired.  She has stopped taking her hormones.    She also complains of chronic fatigue.  She does not have a primary care physician.    Exam: General: Well-developed well-nourished white female no acute distress chest: Clear neurologic: Alert, oriented ×3.    Hemoglobin 13.2    Impression: 1.  Simple endometrial hyperplasia without atypia  2.  Fatigue    Plan: Referred the patient to Dr. Phelan for evaluation of chronic fatigue.  Patient return in October for an ultrasound examination and possible endometrial biopsy to follow-up on the simple endometrial hyperplasia.    Yifan Goldberg MD

## 2018-10-10 ENCOUNTER — OFFICE VISIT (OUTPATIENT)
Dept: OBGYN | Facility: CLINIC | Age: 73
End: 2018-10-10
Payer: MEDICARE

## 2018-10-10 VITALS
HEIGHT: 63 IN | SYSTOLIC BLOOD PRESSURE: 128 MMHG | BODY MASS INDEX: 35.47 KG/M2 | HEART RATE: 68 BPM | WEIGHT: 200.2 LBS | DIASTOLIC BLOOD PRESSURE: 78 MMHG

## 2018-10-10 DIAGNOSIS — N85.01 ENDOMETRIAL HYPERPLASIA WITHOUT ATYPIA, COMPLEX: Primary | ICD-10-CM

## 2018-10-10 PROCEDURE — 88305 TISSUE EXAM BY PATHOLOGIST: CPT | Mod: 26 | Performed by: OBSTETRICS & GYNECOLOGY

## 2018-10-10 PROCEDURE — 58100 BIOPSY OF UTERUS LINING: CPT | Performed by: OBSTETRICS & GYNECOLOGY

## 2018-10-10 PROCEDURE — 99213 OFFICE O/P EST LOW 20 MIN: CPT | Mod: 25 | Performed by: OBSTETRICS & GYNECOLOGY

## 2018-10-10 PROCEDURE — 88305 TISSUE EXAM BY PATHOLOGIST: CPT | Performed by: OBSTETRICS & GYNECOLOGY

## 2018-10-10 RX ORDER — TETRAHYDROZOLINE HCL 0.05 %
1 DROPS OPHTHALMIC (EYE) 3 TIMES DAILY
COMMUNITY
End: 2019-01-08

## 2018-10-10 NOTE — PROGRESS NOTES
SUBJECTIVE:                                                   Vee Espino is a 73 year old female who presents to clinic today for the following health issue(s):  Patient presents with:  Follow Up For: Endometrial Hyperplasia        HPI:  Patient is seen in follow-up.  She has had no more bleeding or spotting.  In  the patient had a hysteroscopy with MyoSure resection of the endometrium.  Pathology showed simple endometrial hyperplasia without atypia.  She is now returning for follow-up examination.    Patient does complain of some abdominal bloating.  She has gained 20 pounds in the past 2 years.  She also has constipation for which she takes MiraLAX.  She has been advised in the past to see a primary care physician.  She lives in [place].  She states that she will arrange for a physician closer to home.    No LMP recorded. Patient is postmenopausal..   Patient is sexually active, .  Using menopause for contraception.    reports that she has quit smoking. She has never used smokeless tobacco.    STD testing offered?  Declined    Health maintenance updated:  yes    Today's PHQ-2 Score: No flowsheet data found.  Today's PHQ-9 Score:   PHQ-9 SCORE 2018   Total Score 0     Today's AINSH-7 Score:   ANISH-7 SCORE 2018   Total Score 0       Problem list and histories reviewed & adjusted, as indicated.  Additional history: as documented.    There is no problem list on file for this patient.    Past Surgical History:   Procedure Laterality Date     CHOLECYSTECTOMY       HERNIA REPAIR, UMBILICAL       OPERATIVE HYSTEROSCOPY WITH MORCELLATOR N/A 2018    Procedure: OPERATIVE HYSTEROSCOPY WITH MORCELLATOR (MYOSURE);  HYSTEROSCOPY WITH MYOSURE ;  Surgeon: Yifan Goldberg MD;  Location: [facility]      Social History   Substance Use Topics     Smoking status: Former Smoker     Smokeless tobacco: Never Used     Alcohol use Yes      Comment: rarely           Current Outpatient Prescriptions  "  Medication Sig     acetaminophen (TYLENOL) 325 MG tablet Take 2 tablets (650 mg) by mouth every 4 hours as needed for other (mild pain)     fluticasone (FLONASE) 50 MCG/ACT spray Spray into both nostrils daily     furosemide (LASIX) 20 MG tablet TAKE ONE TABLET BY MOUTH EVERY DAY IN THE MORNING     loratadine-pseudoePHEDrine (CLARITIN-D 24-HOUR)  MG per 24 hr tablet Take 1 tablet by mouth every morning     tetrahydrozoline 0.05 % ophthalmic solution 1 drop 3 times daily     UNABLE TO FIND MEDICATION NAME: Estrogen pellets     No current facility-administered medications for this visit.      Allergies   Allergen Reactions     Cats      Ibuprofen Hives     Mold      No Clinical Screening - See Comments      Sulfa Drugs Hives     Trees        ROS:  12 point review of systems negative other than symptoms noted below.  Eyes: Spots  Cardiovascular: Lower Extremity Swelling  Respiratory: Shortness of Breath  Gastrointestinal: Heartburn  Musculoskeletal: Height Loss, Muscle Cramps and Muscular Weakness  Endocrine: Decreased Libido and Loss of Hair    OBJECTIVE:     /78  Pulse 68  Ht 5' 3\" (1.6 m)  Wt 200 lb 3.2 oz (90.8 kg)  Breastfeeding? No  BMI 35.46 kg/m2  Body mass index is 35.46 kg/(m^2).    Exam:  Constitutional:  Appearance: Well nourished, well developed alert, in no acute distress  Chest:  Respiratory Effort:  Breathing unlabored  Lymphatic: Lymph Nodes:  No other lymphadenopathy present  Skin:General Inspection:  No rashes present, no lesions present, no areas of discoloration; Genitalia and Groin:  No rashes present, no lesions present, no areas of discoloration, no masses present.  Neurologic/Psychiatric:  Mental Status:  Oriented X3   Pelvic Exam:  External Genitalia:     Normal appearance for age, no discharge present, no tenderness present, no inflammatory lesions present, color normal  Vagina:     Normal vaginal vault without central or paravaginal defects, no discharge present, no " inflammatory lesions present, no masses present  Bladder:     Nontender to palpation  Urethra:   Urethral Body:  Urethra palpation normal, urethra structural support normal   Urethral Meatus:  No erythema or lesions present  Cervix:     Appearance healthy, no lesions present, nontender to palpation, no bleeding present  Uterus:     Uterus: firm, normal sized and nontender, midplane in position.   Adnexa:     No adnexal tenderness present, no adnexal masses present  Perineum:     Perineum within normal limits, no evidence of trauma, no rashes or skin lesions present  Anus:     Anus within normal limits, no hemorrhoids present  Inguinal Lymph Nodes:     No lymphadenopathy present  Pubic Hair:     Normal pubic hair distribution for age  Genitalia and Groin:     No rashes present, no lesions present, no areas of discoloration, no masses present       In-Clinic Test Results:  No results found for this or any previous visit (from the past 24 hour(s)).    ASSESSMENT/PLAN:                                                        ICD-10-CM    1. Endometrial hyperplasia without atypia, complex N85.01 Surgical pathology exam     ENDOMETRIAL BIOPSY W/O CERVICAL DILATION           Plan: The patient will have an endometrial biopsy today    Yifan Goldberg MD  [facility]      INDICATIONS:                                                    Is a pregnancy test required: No.  Was a consent obtained?  Yes    Having endometrial biopsy for History of simple endometrial hyperplasia without atypia    Today's PHQ-2 Score: No flowsheet data found.    PROCEDURE;                                                      A speculum was placed in the vagina and cervix prepped with betadine. A tenaculum was not attached to the cervix. A small plastic 5 mm Pipelle syringe curette was inserted into the cervical canal. The uterus was sounded to 8 cm's. A biopsy was performed, removing amount moderate  of tissue. The speculum was  removed. This tissue was placed in Formalin and sent to pathology.    The patient tolerated the procedure  well and she reported there was no cramping.      POST PROCEDURE;                                                      There  was no cramping at the time of discharge. She  tolerated the procedure well. There were no complications. Patient was discharged in stable condition.    Patient advised to call the clinic if severe pelvic pain, fever or heavy bleeding.    Yifan Goldberg MD

## 2018-10-10 NOTE — MR AVS SNAPSHOT
"              After Visit Summary   10/10/2018    Vee sEpino    MRN: [id number]           Patient Information     Date Of Birth          1945        Visit Information        Provider Department      10/10/2018 10:30 AM Yifan Goldberg MD [facility]        Today's Diagnoses     Endometrial hyperplasia without atypia, complex    -  1       Follow-ups after your visit        Who to contact     If you have questions or need follow up information about today's clinic visit or your schedule please contact [facility] directly at [phone].  Normal or non-critical lab and imaging results will be communicated to you by MyChart, letter or phone within 4 business days after the clinic has received the results. If you do not hear from us within 7 days, please contact the clinic through MyChart or phone. If you have a critical or abnormal lab result, we will notify you by phone as soon as possible.  Submit refill requests through [email or web address] or call your pharmacy and they will forward the refill request to us. Please allow 3 business days for your refill to be completed.          Additional Information About Your Visit        Your Vitals Were     Pulse Height Breastfeeding? BMI (Body Mass Index)          68 5' 3\" (1.6 m) No 35.46 kg/m2         Blood Pressure from Last 3 Encounters:   10/10/18 128/78   06/28/18 110/68   06/14/18 142/80    Weight from Last 3 Encounters:   10/10/18 200 lb 3.2 oz (90.8 kg)   06/28/18 198 lb (89.8 kg)   06/14/18 198 lb 9.6 oz (90.1 kg)              We Performed the Following     ENDOMETRIAL BIOPSY W/O CERVICAL DILATION     Surgical pathology exam        Primary Care Provider Office Phone # Fax #    [facility] [phone]       [facility] [address] [address]        Equal Access to Services     ALTAF COOK AH: corrie Patino " martha cortezofelialakisha morenovanda bean ah. So [facility] [phone].    ATENCIÓN: Si fabrice katz, tiene a yeh disposición servicios gratuitos de asistencia lingüística. Danilo al [phone].    We comply with applicable federal civil rights laws and [place] laws. We do not discriminate on the basis of race, color, national origin, age, disability, sex, sexual orientation, or gender identity.            Thank you!     Thank you for choosing [facility] [place]  for your care. Our goal is always to provide you with excellent care. Hearing back from our patients is one way we can continue to improve our services. Please take a few minutes to complete the written survey that you may receive in the mail after your visit with us. Thank you!             Your Updated Medication List - Protect others around you: Learn how to safely use, store and throw away your medicines at www.disposemymeds.org.          This list is accurate as of 10/10/18 11:35 AM.  Always use your most recent med list.                   Brand Name Dispense Instructions for use Diagnosis    acetaminophen 325 MG tablet    TYLENOL    100 tablet    Take 2 tablets (650 mg) by mouth every 4 hours as needed for other (mild pain)    Acute post-operative pain       fluticasone 50 MCG/ACT spray    FLONASE     Spray into both nostrils daily        furosemide 20 MG tablet    LASIX    30 tablet    TAKE ONE TABLET BY MOUTH EVERY DAY IN THE MORNING    Swelling of lower extremity       loratadine-pseudoePHEDrine  MG per 24 hr tablet    CLARITIN-D 24-hour     Take 1 tablet by mouth every morning        tetrahydrozoline 0.05 % ophthalmic solution      1 drop 3 times daily        UNABLE TO FIND      MEDICATION NAME: Estrogen pellets

## 2018-10-12 LAB — COPATH REPORT: NORMAL

## 2018-11-07 ENCOUNTER — TRANSFERRED RECORDS (OUTPATIENT)
Dept: HEALTH INFORMATION MANAGEMENT | Facility: CLINIC | Age: 73
End: 2018-11-07

## 2019-01-08 ENCOUNTER — ANCILLARY PROCEDURE (OUTPATIENT)
Dept: ULTRASOUND IMAGING | Facility: CLINIC | Age: 74
End: 2019-01-08
Payer: MEDICARE

## 2019-01-08 ENCOUNTER — OFFICE VISIT (OUTPATIENT)
Dept: OBGYN | Facility: CLINIC | Age: 74
End: 2019-01-08
Payer: MEDICARE

## 2019-01-08 VITALS
BODY MASS INDEX: 35.61 KG/M2 | HEIGHT: 63 IN | HEART RATE: 72 BPM | WEIGHT: 201 LBS | SYSTOLIC BLOOD PRESSURE: 112 MMHG | DIASTOLIC BLOOD PRESSURE: 76 MMHG

## 2019-01-08 DIAGNOSIS — R10.9 ABDOMINAL CRAMPS: ICD-10-CM

## 2019-01-08 DIAGNOSIS — R14.0 BLOATING: ICD-10-CM

## 2019-01-08 DIAGNOSIS — R59.0 LYMPHADENOPATHY, AXILLARY: ICD-10-CM

## 2019-01-08 DIAGNOSIS — R14.0 BLOATING: Primary | ICD-10-CM

## 2019-01-08 DIAGNOSIS — R93.89 THICKENED ENDOMETRIUM: ICD-10-CM

## 2019-01-08 DIAGNOSIS — K59.00 CONSTIPATION, UNSPECIFIED CONSTIPATION TYPE: ICD-10-CM

## 2019-01-08 PROCEDURE — 76830 TRANSVAGINAL US NON-OB: CPT | Performed by: OBSTETRICS & GYNECOLOGY

## 2019-01-08 PROCEDURE — 99214 OFFICE O/P EST MOD 30 MIN: CPT | Performed by: OBSTETRICS & GYNECOLOGY

## 2019-01-08 RX ORDER — FINASTERIDE 5 MG/1
5 TABLET, FILM COATED ORAL DAILY
COMMUNITY

## 2019-01-08 ASSESSMENT — MIFFLIN-ST. JEOR: SCORE: 1380.86

## 2019-01-08 NOTE — PROGRESS NOTES
SUBJECTIVE:                                                   Vee Espino is a 73 year old female who presents to clinic today for the following health issue(s):  Patient presents with:  Breast Exam: has a lymph node in left armpit that has been there since pneumonia in November, and was told it should go down and it has not  Bloated: remains bloated after surgery in .          HPI:  Patient is seen for a palpable lymph node under her right axilla and also lower abdominal bloating and cramping.  Patient states that she had pneumonia in [place].  Was treated with x-rays and had a mammogram which showed an enlarged node in her right axilla approximately 2 cm x 2 cm.  There was a second node that was approximately 1 x 1 cm.  She was treated with antibiotics and an inhaler.  Her lymph node is gone down in size but it still palpable.  It is not tender.    Patient also has had some lower abdominal cramping and heaviness.  She has been constipated.  She uses MiraLAX occasionally.  Patient has a history of simple hyperplasia which was previously removed.  Her latest endometrial biopsy in October showed weakly proliferative endometrium with no atypia.  She has not had any bleeding.    No LMP recorded. Patient is postmenopausal..   Patient is sexually active, .  Using menopause for contraception.    reports that she has quit smoking. she has never used smokeless tobacco.    STD testing offered?  Declined    Health maintenance updated:  yes    Today's PHQ-2 Score: No flowsheet data found.  Today's PHQ-9 Score:   PHQ-9 SCORE 2018   PHQ-9 Total Score 0     Today's ANISH-7 Score:   ANISH-7 SCORE 2018   Total Score 0       Problem list and histories reviewed & adjusted, as indicated.  Additional history: as documented.    There is no problem list on file for this patient.    Past Surgical History:   Procedure Laterality Date     CHOLECYSTECTOMY       HERNIA REPAIR, UMBILICAL       OPERATIVE HYSTEROSCOPY WITH  "MORCELLATOR N/A 6/14/2018    Procedure: OPERATIVE HYSTEROSCOPY WITH MORCELLATOR (MYOSURE);  HYSTEROSCOPY WITH MYOSURE ;  Surgeon: Yifan Goldberg MD;  Location: [facility]      Social History     Tobacco Use     Smoking status: Former Smoker     Smokeless tobacco: Never Used   Substance Use Topics     Alcohol use: Yes     Comment: rarely           Current Outpatient Medications   Medication Sig     acetaminophen (TYLENOL) 325 MG tablet Take 2 tablets (650 mg) by mouth every 4 hours as needed for other (mild pain)     finasteride (PROSCAR) 5 MG tablet Take 5 mg by mouth daily     fluticasone (FLONASE) 50 MCG/ACT spray Spray into both nostrils daily     furosemide (LASIX) 20 MG tablet TAKE ONE TABLET BY MOUTH EVERY DAY IN THE MORNING     loratadine-pseudoePHEDrine (CLARITIN-D 24-HOUR)  MG per 24 hr tablet Take 1 tablet by mouth every morning     NORETHINDRONE ACETATE PO Take 5 mg by mouth     No current facility-administered medications for this visit.      Allergies   Allergen Reactions     Cats      Ibuprofen Hives     Mold      No Clinical Screening - See Comments      Sulfa Drugs Hives     Trees        ROS:  12 point review of systems negative other than symptoms noted below.  Constitutional: Loss of Appetite and Weight Gain  Head: Nasal Congestion  Cardiovascular: Lower Extremity Swelling  Gastrointestinal: Abdominal Pain, Constipation and Heartburn  Genitourinary: Cramps  Musculoskeletal: Height Loss  Endocrine: Loss of Hair  Blood/Lymph: Lymph Node Enlargement    OBJECTIVE:     /76   Pulse 72   Ht 1.6 m (5' 3\")   Wt 91.2 kg (201 lb)   BMI 35.61 kg/m    Body mass index is 35.61 kg/m .    Exam:  Constitutional:  Appearance: Well nourished, well developed alert, in no acute distress  Chest:  Respiratory Effort:  Breathing unlabored  Gastrointestinal:  Abdominal Examination:  Abdomen nontender to palpation, tone normal without rigidity or guarding, no masses present, umbilicus without lesions; " Liver/Spleen:  No hepatomegaly present, liver nontender to palpation; Hernias:  No hernias present  Lymphatic: Lymph Nodes: Palpable node right axilla approximately 1.5 cm  Skin:General Inspection:  No rashes present, no lesions present, no areas of discoloration; Genitalia and Groin:  No rashes present, no lesions present, no areas of discoloration, no masses present.  Neurologic/Psychiatric:  Mental Status:  Oriented X3   Pelvic Exam:  External Genitalia:     Normal appearance for age, no discharge present, no tenderness present, no inflammatory lesions present, color normal  Vagina:     Normal vaginal vault without central or paravaginal defects, no discharge present, no inflammatory lesions present, no masses present  Bladder:     Nontender to palpation  Urethra:   Urethral Body:  Urethra palpation normal, urethra structural support normal   Urethral Meatus:  No erythema or lesions present  Cervix:     Appearance healthy, no lesions present, nontender to palpation, no bleeding present  Uterus:     Uterus: firm, normal sized and nontender, anteverted in position.   Adnexa:     No adnexal tenderness present, no adnexal masses present  Perineum:     Perineum within normal limits, no evidence of trauma, no rashes or skin lesions present  Anus:     Anus within normal limits, no hemorrhoids present  Inguinal Lymph Nodes:     No lymphadenopathy present  Pubic Hair:     Normal pubic hair distribution for age  Genitalia and Groin:     No rashes present, no lesions present, no areas of discoloration, no masses present       In-Clinic Test Results:  No results found for this or any previous visit (from the past 24 hour(s)).     Gynecological Ultrasonography:   Uterus: anteverted  Size: 6.54 x 6.39 x 3.94 ncm  Findings: Left intramural fibroid measuring 2.60 x 2.71 x 2.65 cm   Endometrium: Thickness total 14.87 mm  Findings: Thickened endometrium  Right Ovary: Not visulized   Left Ovary: Not visulized  Cul de Sac/Pouch of  Wale: No FF      Impression: Thickened endometrium     Yifan Goldberg MD            ASSESSMENT/PLAN:                                                        ICD-10-CM    1. Bloating R14.0 US Transvaginal Non OB   2. Lymphadenopathy, axillary R59.0     left   3. Abdominal cramps R10.9 US Transvaginal Non OB   4. Thickened endometrium R93.89    5. Constipation, unspecified constipation type K59.00        Plan: Patient was reassured that her lymph nodes seems to be progressively shrinking in size.  If it still palpable in a month she should see her primary care physician.  She may need a fine-needle aspiration.  Ultrasound of this area in the office today showed one lymph node measuring 1.68 x 0.82 cm and a second smaller lymph node measuring 0.57 cm.    Patient was advised to use MiraLAX more regularly for the constipation.  If the constipation does not relieve her symptoms then she may need a CT scan.    Patient will also need a follow-up endometrial biopsy in 3 months.    Yifan Goldberg MD  [facility] [place]

## 2019-10-17 DIAGNOSIS — M79.89 SWELLING OF LOWER EXTREMITY: ICD-10-CM

## 2019-10-17 RX ORDER — FUROSEMIDE 20 MG
TABLET ORAL
Qty: 30 TABLET | Refills: 0 | Status: SHIPPED | OUTPATIENT
Start: 2019-10-17

## 2019-10-17 NOTE — TELEPHONE ENCOUNTER
"Requested Prescriptions   Pending Prescriptions Disp Refills     furosemide (LASIX) 20 MG tablet [Pharmacy Med Name: FUROSEMIDE 20MG TABS] 30 tablet 1     Sig: TAKE ONE TABLET BY MOUTH EVERY MORNING       Diuretics (Including Combos) Protocol Failed - 10/17/2019  9:07 AM        Failed - Normal serum creatinine on file in past 12 months     No lab results found.           Failed - Normal serum potassium on file in past 12 months     No lab results found.                 Failed - Normal serum sodium on file in past 12 months     No lab results found.           Passed - Blood pressure under 140/90 in past 12 months     BP Readings from Last 3 Encounters:   01/08/19 112/76   10/10/18 128/78   06/28/18 110/68                 Passed - Recent (12 mo) or future (30 days) visit within the authorizing provider's specialty     Patient has had an office visit with the authorizing provider or a provider within the authorizing providers department within the previous 12 mos or has a future within next 30 days. See \"Patient Info\" tab in inbasket, or \"Choose Columns\" in Meds & Orders section of the refill encounter.              Passed - Medication is active on med list        Passed - Patient is age 18 or older        Passed - No active pregancy on record        Passed - No positive pregnancy test in past 12 months        Normal BMP 4/1/19   Medication is being filled for 1 time refill only due to:  appointment needed for further refills   Elizabeth Osorio RN on 10/17/2019 at 9:42 AM      "

## 2019-12-03 DIAGNOSIS — M79.89 SWELLING OF LOWER EXTREMITY: ICD-10-CM

## 2019-12-03 RX ORDER — FUROSEMIDE 20 MG
TABLET ORAL
Qty: 30 TABLET | Refills: 0 | OUTPATIENT
Start: 2019-12-03

## 2019-12-03 NOTE — TELEPHONE ENCOUNTER
"Requested Prescriptions   Pending Prescriptions Disp Refills     furosemide (LASIX) 20 MG tablet [Pharmacy Med Name: FUROSEMIDE 20MG TABS] 30 tablet 0     Sig: TAKE ONE TABLET BY MOUTH EVERY MORNING       Diuretics (Including Combos) Protocol Failed - 12/3/2019  8:11 AM        Failed - Normal serum creatinine on file in past 12 months     No lab results found.           Failed - Normal serum potassium on file in past 12 months     No lab results found.                 Failed - Normal serum sodium on file in past 12 months     No lab results found.           Passed - Blood pressure under 140/90 in past 12 months     BP Readings from Last 3 Encounters:   01/08/19 112/76   10/10/18 128/78   06/28/18 110/68                 Passed - Recent (12 mo) or future (30 days) visit within the authorizing provider's specialty     Patient has had an office visit with the authorizing provider or a provider within the authorizing providers department within the previous 12 mos or has a future within next 30 days. See \"Patient Info\" tab in inbasket, or \"Choose Columns\" in Meds & Orders section of the refill encounter.              Passed - Medication is active on med list        Passed - Patient is age 18 or older        Passed - No active pregancy on record        Passed - No positive pregnancy test in past 12 months        Last Written Prescription Date:  10/17/19  Last Fill Quantity: 30,  # refills: 0   Last office visit: 1/8/2019 with prescribing provider:  Ashlyn   Future Office Visit:  None  Pt due for annual, no appt scheduled. Pt already received one month extension. Rx denied.   Kristina Gasca RN on 12/3/2019 at 8:22 AM        "

## 2021-06-01 ENCOUNTER — RECORDS - HEALTHEAST (OUTPATIENT)
Dept: ADMINISTRATIVE | Facility: CLINIC | Age: 76
End: 2021-06-01

## (undated) DEVICE — NDL SPINAL 22GA 5" QUINCKE 405148

## (undated) DEVICE — SOL WATER IRRIG 1000ML BOTTLE 2F7114

## (undated) DEVICE — SYR 03ML LL W/O NDL

## (undated) DEVICE — SEAL SET MYOSURE ROD LENS SCOPE SINGLE USE 40-902

## (undated) DEVICE — PACK TVT HYSTEROSCOPY SMA15HYFSE

## (undated) DEVICE — DECANTER BAG 2002S

## (undated) DEVICE — DILATOR PROBE UTERINE OS CANAL FINDER 260-610

## (undated) DEVICE — SYR 10ML FINGER CONTROL W/O NDL 309695

## (undated) DEVICE — CATH INTERMITTENT CLEAN-CATH FEMALE 14FR 6" VINYL LF 420614

## (undated) DEVICE — SUCTION CANISTER BEMIS HI FLOW 006772-901

## (undated) DEVICE — LINEN TOWEL PACK X5 5464

## (undated) DEVICE — NDL 22GA 1.5"

## (undated) DEVICE — SOL NACL 0.9% IRRIG 1000ML BOTTLE 07138-09

## (undated) DEVICE — GLOVE PROTEXIS W/NEU-THERA 8.0  2D73TE80

## (undated) RX ORDER — FENTANYL CITRATE 50 UG/ML
INJECTION, SOLUTION INTRAMUSCULAR; INTRAVENOUS
Status: DISPENSED
Start: 2018-06-14

## (undated) RX ORDER — ACETAMINOPHEN 325 MG/1
TABLET ORAL
Status: DISPENSED
Start: 2018-06-14

## (undated) RX ORDER — PROPOFOL 10 MG/ML
INJECTION, EMULSION INTRAVENOUS
Status: DISPENSED
Start: 2018-06-14

## (undated) RX ORDER — OXYCODONE HYDROCHLORIDE 5 MG/1
TABLET ORAL
Status: DISPENSED
Start: 2018-06-14

## (undated) RX ORDER — LIDOCAINE HYDROCHLORIDE 10 MG/ML
INJECTION, SOLUTION EPIDURAL; INFILTRATION; INTRACAUDAL; PERINEURAL
Status: DISPENSED
Start: 2018-06-14

## (undated) RX ORDER — LIDOCAINE HYDROCHLORIDE 20 MG/ML
INJECTION, SOLUTION EPIDURAL; INFILTRATION; INTRACAUDAL; PERINEURAL
Status: DISPENSED
Start: 2018-06-14

## (undated) RX ORDER — DEXAMETHASONE SODIUM PHOSPHATE 4 MG/ML
INJECTION, SOLUTION INTRA-ARTICULAR; INTRALESIONAL; INTRAMUSCULAR; INTRAVENOUS; SOFT TISSUE
Status: DISPENSED
Start: 2018-06-14

## (undated) RX ORDER — CEFAZOLIN SODIUM 2 G/100ML
INJECTION, SOLUTION INTRAVENOUS
Status: DISPENSED
Start: 2018-06-14

## (undated) RX ORDER — ONDANSETRON 2 MG/ML
INJECTION INTRAMUSCULAR; INTRAVENOUS
Status: DISPENSED
Start: 2018-06-14